# Patient Record
Sex: FEMALE | Race: WHITE | Employment: FULL TIME | URBAN - METROPOLITAN AREA
[De-identification: names, ages, dates, MRNs, and addresses within clinical notes are randomized per-mention and may not be internally consistent; named-entity substitution may affect disease eponyms.]

---

## 2017-01-03 ENCOUNTER — APPOINTMENT (OUTPATIENT)
Dept: PHYSICAL THERAPY | Facility: HOSPITAL | Age: 47
End: 2017-01-03
Payer: COMMERCIAL

## 2017-01-06 ENCOUNTER — TELEPHONE (OUTPATIENT)
Dept: FAMILY MEDICINE CLINIC | Facility: CLINIC | Age: 47
End: 2017-01-06

## 2017-01-06 DIAGNOSIS — M25.562 CHRONIC PAIN OF LEFT KNEE: Primary | ICD-10-CM

## 2017-01-06 DIAGNOSIS — G89.29 CHRONIC PAIN OF LEFT KNEE: Primary | ICD-10-CM

## 2017-01-06 NOTE — TELEPHONE ENCOUNTER
Angelica from [facility] requesting new referral, pt is to be seen on Mon 9th at 6pm.

## 2017-01-10 ENCOUNTER — TELEPHONE (OUTPATIENT)
Dept: PHYSICAL THERAPY | Facility: HOSPITAL | Age: 47
End: 2017-01-10

## 2017-01-10 NOTE — TELEPHONE ENCOUNTER
Missed PT appointment on Monday 1/9/17.   Confirmed next appointment for Thursday 1/12/17 at 6:00 pm.

## 2017-01-12 ENCOUNTER — APPOINTMENT (OUTPATIENT)
Dept: PHYSICAL THERAPY | Facility: HOSPITAL | Age: 47
End: 2017-01-12
Payer: COMMERCIAL

## 2017-01-24 ENCOUNTER — APPOINTMENT (OUTPATIENT)
Dept: PHYSICAL THERAPY | Facility: HOSPITAL | Age: 47
End: 2017-01-24
Attending: FAMILY MEDICINE
Payer: COMMERCIAL

## 2017-02-02 ENCOUNTER — APPOINTMENT (OUTPATIENT)
Dept: SURGERY | Facility: CLINIC | Age: 47
End: 2017-02-02

## 2017-05-03 ENCOUNTER — OFFICE VISIT (OUTPATIENT)
Dept: FAMILY MEDICINE CLINIC | Facility: CLINIC | Age: 47
End: 2017-05-03

## 2017-05-03 VITALS
SYSTOLIC BLOOD PRESSURE: 118 MMHG | DIASTOLIC BLOOD PRESSURE: 87 MMHG | HEIGHT: 62 IN | TEMPERATURE: 99 F | WEIGHT: 237 LBS | RESPIRATION RATE: 102 BRPM | BODY MASS INDEX: 43.61 KG/M2

## 2017-05-03 DIAGNOSIS — H65.92 LEFT NON-SUPPURATIVE OTITIS MEDIA: Primary | ICD-10-CM

## 2017-05-03 DIAGNOSIS — H61.21 IMPACTED CERUMEN OF RIGHT EAR: ICD-10-CM

## 2017-05-03 PROCEDURE — 99212 OFFICE O/P EST SF 10 MIN: CPT | Performed by: NURSE PRACTITIONER

## 2017-05-03 PROCEDURE — 99214 OFFICE O/P EST MOD 30 MIN: CPT | Performed by: NURSE PRACTITIONER

## 2017-05-03 RX ORDER — CLARITHROMYCIN 500 MG/1
500 TABLET, COATED ORAL 2 TIMES DAILY
Qty: 20 TABLET | Refills: 0 | Status: SHIPPED | OUTPATIENT
Start: 2017-05-03 | End: 2017-05-13

## 2017-05-03 NOTE — PROGRESS NOTES
Ear Pain   There is pain in the left ear. This is a new problem. The current episode started yesterday. The problem occurs constantly. The problem has been unchanged. The maximum temperature recorded prior to her arrival was 100.4 - 100.9 F.  The fever has Concern Yes    Comment: Coffee, Tea     Social History Narrative         Current Outpatient Prescriptions:  clarithromycin 500 MG Oral Tab Take 1 tablet (500 mg total) by mouth 2 (two) times daily.  Disp: 20 tablet Rfl: 0       Allergies:    Amoxicillin

## 2017-05-03 NOTE — PATIENT INSTRUCTIONS
To help ease ear infection and pain:    1. Sitting upright lessens the throbbing. 2. A heating pad on low over the ear can help by diverting blood flow away from the ear drum.   3. Pain medications are the best thing to help pain - use them as needed for t

## 2017-07-13 ENCOUNTER — OFFICE VISIT (OUTPATIENT)
Dept: OBGYN CLINIC | Facility: CLINIC | Age: 47
End: 2017-07-13

## 2017-07-13 ENCOUNTER — APPOINTMENT (OUTPATIENT)
Dept: LAB | Facility: HOSPITAL | Age: 47
End: 2017-07-13
Attending: OBSTETRICS & GYNECOLOGY
Payer: COMMERCIAL

## 2017-07-13 VITALS
DIASTOLIC BLOOD PRESSURE: 76 MMHG | BODY MASS INDEX: 44 KG/M2 | HEART RATE: 82 BPM | WEIGHT: 243 LBS | SYSTOLIC BLOOD PRESSURE: 113 MMHG

## 2017-07-13 DIAGNOSIS — N95.1 PERIMENOPAUSE: ICD-10-CM

## 2017-07-13 DIAGNOSIS — N95.1 PERIMENOPAUSE: Primary | ICD-10-CM

## 2017-07-13 LAB
ESTRADIOL SERPL-MCNC: 21 PG/ML
FSH SERPL-ACNC: 21.3 MIU/ML

## 2017-07-13 PROCEDURE — 83001 ASSAY OF GONADOTROPIN (FSH): CPT

## 2017-07-13 PROCEDURE — 82670 ASSAY OF TOTAL ESTRADIOL: CPT

## 2017-07-13 PROCEDURE — 36415 COLL VENOUS BLD VENIPUNCTURE: CPT

## 2017-07-13 PROCEDURE — 99213 OFFICE O/P EST LOW 20 MIN: CPT | Performed by: OBSTETRICS & GYNECOLOGY

## 2017-07-13 RX ORDER — HYDROCHLOROTHIAZIDE 12.5 MG/1
CAPSULE, GELATIN COATED ORAL
COMMUNITY
Start: 2017-07-12

## 2017-07-13 NOTE — PROGRESS NOTES
Well Woman Exam    HPI:  The patient is a ***  Reviewed medical and surgical history below   OBSTETRICS HISTORY:  Obstetric History     T0    L0    SAB0  TAB0  Ectopic0  Multiple0  Live Births0       GYNE HISTORY:    Sexual activity: Not Macel Col[place] depression or anxiety.        07/13/17  1415   BP: 113/76   Pulse: 82       PHYSICAL EXAM:   Constitutional: well developed, well nourished  Head/Face: normocephalic  Neck/Thyroid: thyroid symmetric, no thyromegaly, no nodules, no adenopathy  Heart: Regular

## 2017-07-13 NOTE — PROGRESS NOTES
Juany Gregorio is a 51year old female  Patient's last menstrual period was 10/15/2016 (within days). Patient presents with:  Gyn Problem: Irregular menses LMP 10/15/2016  Patient presents today for amenorrhea since Oct. 2016.  She states she had her depression or anxiety. 07/13/17  1415   BP: 113/76   Pulse: 82         Assessment & Plan:      Carolynn-menopausal   1. Reviewed amenorrhea may be due to menopause however at this time it is hard to tell.  She did not take provera in the past.   2. Labs las

## 2017-08-03 ENCOUNTER — TELEPHONE (OUTPATIENT)
Dept: OBGYN CLINIC | Facility: CLINIC | Age: 47
End: 2017-08-03

## 2017-08-03 NOTE — TELEPHONE ENCOUNTER
Left message for patient to call back to go over lab results. Will discuss Provera withdrawal bleed as an option.

## 2020-11-02 ENCOUNTER — NURSE TRIAGE (OUTPATIENT)
Dept: FAMILY MEDICINE CLINIC | Facility: CLINIC | Age: 50
End: 2020-11-02

## 2020-11-02 NOTE — TELEPHONE ENCOUNTER
Action Requested: Summary for Provider     []  Critical Lab, Recommendations Needed  [] Need Additional Advice  []   FYI    []   Need Orders  [] Need Medications Sent to Pharmacy  []  Other     SUMMARY:     Patient exposed to mother's nephew on 10/30 who t

## 2023-07-26 ENCOUNTER — TELEPHONE (OUTPATIENT)
Dept: FAMILY MEDICINE CLINIC | Facility: CLINIC | Age: 53
End: 2023-07-26

## 2023-08-01 ENCOUNTER — PATIENT OUTREACH (OUTPATIENT)
Dept: CASE MANAGEMENT | Age: 53
End: 2023-08-01

## 2023-08-01 NOTE — PROCEDURES
The office order for PCP removal request is Approved and finalized on August 1, 2023.     Thanks,  [facility] [employer]

## 2024-04-25 ENCOUNTER — OFFICE VISIT (OUTPATIENT)
Dept: FAMILY MEDICINE CLINIC | Facility: CLINIC | Age: 54
End: 2024-04-25
Payer: COMMERCIAL

## 2024-04-25 VITALS
TEMPERATURE: 97 F | WEIGHT: 238 LBS | HEIGHT: 64 IN | DIASTOLIC BLOOD PRESSURE: 92 MMHG | SYSTOLIC BLOOD PRESSURE: 148 MMHG | BODY MASS INDEX: 40.63 KG/M2 | HEART RATE: 69 BPM

## 2024-04-25 DIAGNOSIS — E66.01 MORBID OBESITY DUE TO EXCESS CALORIES (HCC): Chronic | ICD-10-CM

## 2024-04-25 DIAGNOSIS — Z12.31 VISIT FOR SCREENING MAMMOGRAM: Chronic | ICD-10-CM

## 2024-04-25 DIAGNOSIS — I10 ESSENTIAL HYPERTENSION: ICD-10-CM

## 2024-04-25 DIAGNOSIS — Z23 NEED FOR VACCINATION: ICD-10-CM

## 2024-04-25 DIAGNOSIS — R06.83 SNORING: Chronic | ICD-10-CM

## 2024-04-25 DIAGNOSIS — G47.8 UNREFRESHED BY SLEEP: Chronic | ICD-10-CM

## 2024-04-25 DIAGNOSIS — Z00.00 ADULT GENERAL MEDICAL EXAM: Primary | ICD-10-CM

## 2024-04-25 DIAGNOSIS — Z12.4 CERVICAL CANCER SCREENING: ICD-10-CM

## 2024-04-25 DIAGNOSIS — Z12.11 SCREENING FOR COLON CANCER: ICD-10-CM

## 2024-04-25 DIAGNOSIS — R40.0 HAS DAYTIME DROWSINESS: Chronic | ICD-10-CM

## 2024-04-25 PROCEDURE — 90715 TDAP VACCINE 7 YRS/> IM: CPT | Performed by: FAMILY MEDICINE

## 2024-04-25 PROCEDURE — 90750 HZV VACC RECOMBINANT IM: CPT | Performed by: FAMILY MEDICINE

## 2024-04-25 PROCEDURE — 99386 PREV VISIT NEW AGE 40-64: CPT | Performed by: FAMILY MEDICINE

## 2024-04-25 PROCEDURE — 90472 IMMUNIZATION ADMIN EACH ADD: CPT | Performed by: FAMILY MEDICINE

## 2024-04-25 PROCEDURE — 96127 BRIEF EMOTIONAL/BEHAV ASSMT: CPT | Performed by: FAMILY MEDICINE

## 2024-04-25 PROCEDURE — 90471 IMMUNIZATION ADMIN: CPT | Performed by: FAMILY MEDICINE

## 2024-04-25 NOTE — PROGRESS NOTES
Patient ID: Lianet Banuelos is a 53 year old female.    HPI  Chief Complaint   Patient presents with    Routine Physical     Last physical on 12/23/2015.    Patient is overdue for labs and is aware she's due. Pt hasn't completed a mammogram or a pap smear recently. She has never had a colonoscopy as well; I provided a referral to GI. She will receive the Tetanus and Shingrix vaccines today. Pt doesn't monitor her BP regularly at home; however, when she did recently, her systolic was 132 at home as she checks her mother's blood pressure. She states her BP has never been as high as the results we got at the office today; I discussed this with her.  She is not having any vision changes, headaches, chest pain or shortness of breath.    She snores and feels unrefreshed by sleep. Pt has daytime drowsiness. She has never completed a sleep study in the past; I discussed this with her.  Her mother states that she does snore.      Health Maintenance   Topic Date Due    Annual Physical  Never done    Colorectal Cancer Screening  Never done    DTaP,Tdap,and Td Vaccines (1 - Tdap) Never done    Mammogram  01/19/2017    Pap Smear  01/21/2019    Zoster Vaccines (1 of 2) Never done    COVID-19 Vaccine (1 - 2023-24 season) Never done    Annual Depression Screening  01/01/2024    Influenza Vaccine (Season Ended) 10/01/2024    Pneumococcal Vaccine: Birth to 64yrs  Aged Out       =======================================================    Lab Results   Component Value Date    WBC 6.9 12/23/2015    RBC 5.04 12/23/2015    HGB 13.5 12/23/2015    HCT 43.1 12/23/2015     12/23/2015    MPV 10.3 12/23/2015    MCV 85.6 12/23/2015    MCH 26.9 (L) 12/23/2015    MCHC 31.4 (L) 12/23/2015    RDW 13.9 12/23/2015    NEUT 4.4 12/23/2015    LYMPH 1.8 12/23/2015    MON 0.5 12/23/2015    EOS 0.2 12/23/2015    BASO 0.1 12/23/2015    NEPERCENT 63 12/23/2015    LYPERCENT 26 12/23/2015    MOPERCENT 7 12/23/2015    EOPERCENT 2 12/23/2015    BAPERCENT 1  12/23/2015       Lab Results   Component Value Date     (H) 12/23/2015    BUN 11 12/23/2015    BUNCREA 20.0 12/23/2015    CREATSERUM 0.55 12/23/2015    ANIONGAP 6 12/23/2015    GFRNAA >60 12/23/2015    GFRAA >60 12/23/2015    CA 8.9 12/23/2015    OSMOCALC 286 12/23/2015    AST 24 12/23/2015    ALT 30 12/23/2015    ALKPHOS 87 12/23/2015    BILT 0.4 12/23/2015    TP 7.3 12/23/2015    ALB 3.9 12/23/2015    GLOBULIN 3.4 12/23/2015    AGRATIO 1.1 12/23/2015     12/23/2015    K 4.1 12/23/2015     12/23/2015    CO2 25 12/23/2015       Lab Results   Component Value Date     (H) 12/23/2015    BUN 11 12/23/2015    CREATSERUM 0.55 12/23/2015    BUNCREA 20.0 12/23/2015    ANIONGAP 6 12/23/2015    GFRAA >60 12/23/2015    GFRNAA >60 12/23/2015    CA 8.9 12/23/2015     12/23/2015    K 4.1 12/23/2015     12/23/2015    CO2 25 12/23/2015    OSMOCALC 286 12/23/2015       Lab Results   Component Value Date    CHOLEST 201 (H) 12/23/2015    TRIG 114 12/23/2015    HDL 58 12/23/2015     (H) 12/23/2015    CALCNONHDL 143 (H) 12/23/2015     TSH (S) (uIU/mL)   Date Value   12/23/2015 1.04       =======================================================    Wt Readings from Last 6 Encounters:   04/25/24 238 lb   07/13/17 243 lb   05/03/17 237 lb   12/29/16 237 lb 8 oz   11/21/16 232 lb   11/12/16 232 lb               BMI Readings from Last 6 Encounters:   04/25/24 40.85 kg/m²   07/13/17 44.45 kg/m²   05/03/17 43.35 kg/m²   12/29/16 43.44 kg/m²   11/21/16 41.10 kg/m²   11/12/16 41.11 kg/m²       BP Readings from Last 6 Encounters:   04/25/24 (!) 148/92   07/13/17 113/76   05/03/17 118/87   12/29/16 130/80   11/21/16 134/84   11/12/16 154/70         Review of Systems   Respiratory:  Negative for shortness of breath.    Cardiovascular:  Negative for chest pain.         Past Medical History:    Hypercholesterolemia    Morbid obesity with BMI of 40.0-44.9, adult (HCC)       Past Surgical History:   Procedure  Laterality Date    Other surgical history      trabeculectomy    Tonsillectomy      age 4        Social History     Socioeconomic History    Marital status: Single     Spouse name: Not on file    Number of children: Not on file    Years of education: Not on file    Highest education level: Not on file   Occupational History    Not on file   Tobacco Use    Smoking status: Never    Smokeless tobacco: Never   Substance and Sexual Activity    Alcohol use: No     Alcohol/week: 0.0 standard drinks of alcohol    Drug use: No    Sexual activity: Not Currently     Partners: Male   Other Topics Concern     Service Not Asked    Blood Transfusions Not Asked    Caffeine Concern Yes     Comment: Coffee, Tea    Occupational Exposure Not Asked    Hobby Hazards Not Asked    Sleep Concern Not Asked    Stress Concern Not Asked    Weight Concern Not Asked    Special Diet Not Asked    Back Care Not Asked    Exercise Not Asked    Bike Helmet Not Asked    Seat Belt Not Asked    Self-Exams Not Asked   Social History Narrative    Not on file     Social Determinants of Health     Financial Resource Strain: Not on file   Food Insecurity: Not on file   Transportation Needs: Not on file   Physical Activity: Not on file   Stress: Not on file   Social Connections: Not on file   Housing Stability: Not on file          No current outpatient medications on file.     Allergies:  Allergies   Allergen Reactions    Amoxicillin HIVES      PHYSICAL EXAM:   Physical Exam      Physical Exam   Constitutional: . She appears well-developed and well-nourished. No distress.   Head: Normocephalic.   Right Ear: Tympanic membrane and ear canal normal.   Left Ear: Tympanic membrane and ear canal normal.   Nose: No mucosal edema or rhinorrhea.  Mouth/Throat: Oropharynx is clear and moist and mucous membranes are normal. Tonsils are surgically absent.   Eyes: Conjunctivae and EOM are normal. Pupils are equal, round, and reactive to light.   Neck: Normal range  of motion. Neck supple. No thyromegaly present.   Cardiovascular: Normal rate, regular rhythm and no murmur heard.   Pulmonary/Chest: Effort normal and breath sounds normal. No respiratory distress.   Abdominal: Soft. Bowel sounds are normal. There is no hepatosplenomegaly. There is no tenderness.   Lymphadenopathy: She has no cervical adenopathy.   Neurological: She is alert and oriented to person, place, and time. She has normal reflexes. No cranial nerve deficit.   Skin: Skin is warm and dry. No rash noted.   Psychiatric: She has a normal mood and affect.  Lower legs: No edema of the legs bilaterally.    Vitals reviewed.    Blood pressure 153/85, pulse 69, temperature 97.2 °F (36.2 °C), temperature source Temporal, height 5' 4\" (1.626 m), weight 238 lb, not currently breastfeeding.    Vitals:    04/25/24 1307 04/25/24 1321   BP: 153/85 (!) 148/92   Pulse: 69    Temp: 97.2 °F (36.2 °C)    TempSrc: Temporal    Weight: 238 lb    Height: 5' 4\" (1.626 m)             ASSESSMENT/PLAN:     Diagnoses and all orders for this visit:    Adult general medical exam  -     CBC With Differential With Platelet  -     Comp Metabolic Panel (14)  -     Lipid Panel  -     Assay, Thyroid Stim Hormone  She will do labs at [facility].  Screening for colon cancer  -     GASTRO - INTERNAL    Cervical cancer screening  -     OBG - INTERNAL    Visit for screening mammogram  -     [facility] DESI 2D+3D SCREENING BILAT (CPT=77067/03634); Future    Need for vaccination  -     Immunization Admin Counseling, 1st Component, 18 years and older  -     TdaP (Boostrix/Adacel) Vaccine (> 7 Y)  -     Immunization Admin Counseling, Additional Component, 18 years and older  -     Shingrix (Shingles recombinant) Immunization (50 and older)  I told her the second Shingrix will be 2 months or later.  Snoring  -     OP [facility] ALT REFERRAL HOME SLEEP APNEA TEST  Explained what sleep apnea is and explained how dangerous that it is.  She will get a home sleep apnea  test.  Unrefreshed by sleep  -     OP EM ALT REFERRAL HOME SLEEP APNEA TEST    Has daytime drowsiness  -     OP EM ALT REFERRAL HOME SLEEP APNEA TEST    Morbid obesity due to excess calories (HCC)  -     OP EM ALT REFERRAL HOME SLEEP APNEA TEST  -     Hemoglobin A1C    Essential hypertension  -     EKG 12 Lead; Future  -     XR CHEST PA + LAT CHEST (CPT=71046); Future  -     URINALYSIS, ROUTINE [[id number]][Q]  Blood pressure was elevated.  I would like to do an EKG, chest x-ray as she does also have morbid obesity.      Referrals (if applicable)  Orders Placed This Encounter   Procedures    OBG - INTERNAL     OB/GYN.     Referral Priority:   Routine     Referral Type:   OFFICE VISIT     Referred to Provider:   Gagandeep Dupree PA-C     Requested Specialty:   OBSTETRICS & GYNECOLOGY     Number of Visits Requested:   3    GASTRO - INTERNAL     Gastroenterology Department phone number is [phone].  ++++ Ask for first available provider that can see you ++++.     Referral Priority:   Routine     Referral Type:   OFFICE VISIT     Requested Specialty:   GASTROENTEROLOGY     Number of Visits Requested:   3    OP EM ALT REFERRAL HOME SLEEP APNEA TEST     Referral Priority:   Routine     Referral Type:   Sleep Study       Follow up if symptoms persist.  Take medicine (if given) as prescribed.  Approach to treatment discussed and patient/family member understands and agrees to plan.     No follow-ups on file.    Patient Instructions   Fast for 10 hours.  You can have water but no food and then get your labs done at [facility].                                           BLOOD PRESSURE CUFF    Get a blood pressure cuff that goes on the arm.  Do not get the wrist cuff.  Check your blood pressures on your left arm daily at home.  Do this with your left arm supported by a table, do not have it hanging down at your side.  Make sure you do this before you have any coffee or caffeine.  Go ahead and write these numbers down a  piece of paper for me.  After you get about 10 to 14 days worth of numbers send them to me through [employer].  [employer] is a very reputable brand.       Jamil Lopez    4/25/2024    By signing my name below, I, Jamil Lopez,  attest that this documentation has been prepared under the direction and in the presence of Stephen Estrada DO.   Electronically Signed: Jamil Lopez, 4/25/2024, 1:07 PM.    I, Stephen Estrada DO,  personally performed the services described in this documentation. All medical record entries made by the scribe were at my direction and in my presence.  I have reviewed the chart and discharge instructions (if applicable) and agree that the record reflects my personal performance and is accurate and complete.  Stephen Estrada DO, 4/25/2024, 1:36 PM

## 2024-04-25 NOTE — PATIENT INSTRUCTIONS
Fast for 10 hours.  You can have water but no food and then get your labs done at [facility].                                           BLOOD PRESSURE CUFF    Get a blood pressure cuff that goes on the arm.  Do not get the wrist cuff.  Check your blood pressures on your left arm daily at home.  Do this with your left arm supported by a table, do not have it hanging down at your side.  Make sure you do this before you have any coffee or caffeine.  Go ahead and write these numbers down a piece of paper for me.  After you get about 10 to 14 days worth of numbers send them to me through [employer].  [employer] is a very reputable brand.

## 2024-06-18 ENCOUNTER — OFFICE VISIT (OUTPATIENT)
Dept: OBGYN CLINIC | Facility: CLINIC | Age: 54
End: 2024-06-18

## 2024-06-18 ENCOUNTER — OFFICE VISIT (OUTPATIENT)
Dept: SLEEP CENTER | Age: 54
End: 2024-06-18
Attending: FAMILY MEDICINE

## 2024-06-18 VITALS
SYSTOLIC BLOOD PRESSURE: 115 MMHG | DIASTOLIC BLOOD PRESSURE: 75 MMHG | WEIGHT: 235 LBS | BODY MASS INDEX: 40.12 KG/M2 | HEIGHT: 64 IN | HEART RATE: 70 BPM

## 2024-06-18 DIAGNOSIS — E66.01 MORBID OBESITY DUE TO EXCESS CALORIES (HCC): Primary | ICD-10-CM

## 2024-06-18 DIAGNOSIS — Z01.419 WELL WOMAN EXAM WITH ROUTINE GYNECOLOGICAL EXAM: Primary | ICD-10-CM

## 2024-06-18 PROCEDURE — 99386 PREV VISIT NEW AGE 40-64: CPT | Performed by: STUDENT IN AN ORGANIZED HEALTH CARE EDUCATION/TRAINING PROGRAM

## 2024-06-18 PROCEDURE — 95806 SLEEP STUDY UNATT&RESP EFFT: CPT

## 2024-06-18 NOTE — PROGRESS NOTES
[facility]  Obstetrics and Gynecology  Annual  Gagandeep Dupree PA-C    Chief Complaint   Patient presents with    Annual     Pt here for annual       Lianet S Lakisha is a 53 year old female  presenting for her annual well woman exam. No complaints or concerns today. Patient's last menstrual period was 10/15/2016 (within days).  Postmenopause. 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 Not sexually active. She denies any abnormal vaginal discharge, odor, irritation, or itching. No breast pain or masses. No dysuria or hematuria.     Pap:2016   Contraception:Postmenopause  Mammo: 2016  Colonoscopy: 2016      OBSTETRICS HISTORY:     OB History    Para Term  AB Living   0 0 0 0 0 0   SAB IAB Ectopic Multiple Live Births   0 0 0 0         GYNE HISTORY:     Menarche: 8 (2024  2:47 PM)  Use of Birth Control (if yes, specify type): Postmenopausal (2024  2:47 PM)  Hx Prior Abnormal Pap: No (2024  2:47 PM)  Pap Date: 16 (2024  2:47 PM)  Pap Result Notes: hpv positive (2024  2:47 PM)      History   Sexual Activity    Sexual activity: Not Currently    Partners: Male            No data to display                  MEDICAL HISTORY:     Past Medical History:   Diagnosis Date    Hypercholesterolemia     Morbid obesity with BMI of 40.0-44.9, adult (HCC)       Past Surgical History:   Procedure Laterality Date    Tonsillectomy      age 4        SOCIAL HISTORY:     Tobacco Use: Low Risk  (2024)    Patient History     Smoking Tobacco Use: Never     Smokeless Tobacco Use: Never     Passive Exposure: Never       Depression Screening:   Depression Screening (PHQ-2/PHQ-9): Over the LAST 2 WEEKS   Little interest or pleasure in doing things (over the last two weeks)?: Not at all    Feeling down, depressed, or hopeless (over the last two weeks)?: Not at all    PHQ-2 SCORE: 0          FAMILY HISTORY:     Family History   Problem Relation Age of Onset    Lipids  Mother         Hyperlipidemia    Hypertension Mother        MEDICATIONS:     No current outpatient medications on file.    ALLERGIES:       Allergies   Allergen Reactions    Amoxicillin HIVES       REVIEW OF SYSTEMS:     Review of Systems   Constitutional:  Negative for chills, fever and unexpected weight change.   Respiratory: Negative.     Cardiovascular: Negative.    Gastrointestinal:  Negative for abdominal pain, constipation, diarrhea and nausea.   Genitourinary:  Negative for dyspareunia, dysuria, genital sores, hematuria, menstrual problem, pelvic pain, vaginal bleeding, vaginal discharge and vaginal pain.   Musculoskeletal: Negative.    Skin: Negative.    Neurological: Negative.    Hematological: Negative.    Psychiatric/Behavioral: Negative.           PHYSICAL EXAM:     Vitals:    06/18/24 1442   BP: 115/75   Pulse: 70   Weight: 235 lb (106.6 kg)   Height: 5' 4\" (1.626 m)       Body mass index is 40.34 kg/m².     Constitutional: well developed, well nourished  Psychiatric:  Oriented to time, place, person and situation. Appropriate mood and affect  Head/Face: normocephalic  Neck/Thyroid: thyroid symmetric, no thyromegaly, no nodules, no adenopathy  Lymphatic:no abnormal supraclavicular or axillary adenopathy is noted  Breast: normal without palpable masses, tenderness, asymmetry, nipple discharge, nipple retraction or skin changes  Abdomen:  soft, nontender, nondistended, no masses  Skin/Hair: no unusual rashes or bruises  Extremities: no edema, no cyanosis    Pelvic Exam:  External Genitalia: normal appearance, hair distribution, and no lesions  Urethral Meatus:  normal in size, location, without lesions and prolapse  Bladder:  No fullness, masses or tenderness  Vagina:  Normal appearance without lesions, no abnormal discharge  Cervix:  Normal without tenderness on motion  Uterus: normal in size, contour, position, mobility, without tenderness  Adnexa: normal without masses or tenderness  Perineum:  normal  Anus: no hemorroids       ASSESSMENT:     Lianet was seen today for annual.    Diagnoses and all orders for this visit:    Well woman exam with routine gynecological exam  -     ThinPrep PAP Smear; Future  -     Hpv Dna  High Risk , Thin Prep Collect; Future            PLAN:   Normal exam.  Pap smear done.   Recommend repeat pap smear with co-testing every 3 years for normal/ -HPV.  Recommend annual screening mammograms.   Recommend screening colonoscopy starting at 50  Recommend DEXA scan for osteoporosis as indicated.  Discussed importance of incorporating frequent weight bearing exercises and supplemental Vitamin D  Maintain healthy lifestyle with well-balance diet and daily exercise.  Return to clinic in one year or as needed.        SUMMARY:  Pap: Next cotest 3-5 years per ASCCP guidelines.  BCM:  Postmenopausal  STD screening: No  Mammogram: has order from PCP  HM updated    FOLLOW-UP     No follow-ups on file.    ARNOL WATSON PA-C  2:49 PM  6/18/2024    Note to patient and family:  The 21st Century Cures Act makes medical notes available to patients in the interest of transparency.  However, please be advised that this is a medical document.  It is intended as a peer to peer communication.  It is written in medical language and may contain abbreviations or verbiage that are technical and unfamiliar.  It may appear blunt or direct.  Medical documents are intended to carry relevant information, facts as evident, and the clinical opinion of the practitioner.

## 2024-06-19 LAB — HPV E6+E7 MRNA CVX QL NAA+PROBE: NEGATIVE

## 2024-06-20 ENCOUNTER — ORDER TRANSCRIPTION (OUTPATIENT)
Dept: SLEEP CENTER | Age: 54
End: 2024-06-20

## 2024-06-20 DIAGNOSIS — G47.8 SLEEP AROUSAL DISORDER: ICD-10-CM

## 2024-06-20 DIAGNOSIS — E66.01 MORBID OBESITY (HCC): Primary | ICD-10-CM

## 2024-06-20 DIAGNOSIS — R06.83 SNORING: ICD-10-CM

## 2024-06-20 DIAGNOSIS — R40.0 HAS DAYTIME DROWSINESS: ICD-10-CM

## 2024-06-21 NOTE — PROCEDURES
[facility]  Accredited by the American Academy of Sleep Medicine (AASM)    PATIENT'S NAME: LUPE GARCÍA   ATTENDING PHYSICIAN: Stephen Estrada DO   REFERRING PHYSICIAN: Stephen Estrada DO   PATIENT ACCOUNT #: [id number] LOCATION: Sleep Center   MEDICAL RECORD #: [id number] YOB: 1970   DATE OF STUDY: 06/18/2024       SLEEP STUDY REPORT    STUDY TYPE:  Home sleep test.    INDICATION:  Suspected obstructive sleep apnea (ICD-10 code G47.33) in patient with witnessed apneic events, snoring, unrefreshing sleep, nocturnal awakenings, 20-pound weight gain, body mass index 39.9, and an Drayton Sleepiness Scale score of 4.    RESULTS:  The patient underwent home sleep test with measurement of her nasal air flow, nasal air pressure, snoring, chest and abdominal wall motion, oximetry, and body position.  I have reviewed the entirety of the raw data of this study.  During this study, the total recording time is 557 minutes.  The lights-out clock time is 11:12 p.m., lights-on clock time is 8:29 a.m.  The apnea plus hypopnea index is 10 events per hour.  The supine apnea plus hypopnea index is 12.4 events per hour.  The average oxygen saturation is 97%, the lowest oxygen saturation is 88%, and the patient spent 0.8% of the test with saturations 88% or less.  The average heart rate was 68 beats per minute, and the patient spent approximately 50% of the test in the supine position.    INTERPRETATION:  The data generated from this study is consistent with mild obstructive sleep apnea (ICD-10 code G47.33).    RECOMMENDATIONS:    1.   Would consider CPAP titration in this symptomatic patient.    2.   Weight loss.    3.   Avoid alcohol.    4.   Avoid sedating drug.  5.   Patient should not drive if at all sleepy.     Please do not hesitate to contact me if there is any question whatsoever regarding interpretation of this study.    Dictated By Marcelo Rice MD  d: 06/20/2024 18:34:43  t: 06/20/2024  20:12:22  [facility] [id number]  [facility]/    cc: Stephen Estrada DO

## 2024-06-23 DIAGNOSIS — G47.33 OSA (OBSTRUCTIVE SLEEP APNEA): Primary | Chronic | ICD-10-CM

## 2024-07-09 ENCOUNTER — HOSPITAL ENCOUNTER (OUTPATIENT)
Dept: MAMMOGRAPHY | Age: 54
Discharge: HOME | End: 2024-07-09
Attending: FAMILY MEDICINE
Payer: COMMERCIAL

## 2024-07-09 DIAGNOSIS — Z12.31 VISIT FOR SCREENING MAMMOGRAM: Chronic | ICD-10-CM

## 2024-07-09 PROCEDURE — 77067 SCR MAMMO BI INCL CAD: CPT | Performed by: FAMILY MEDICINE

## 2024-07-09 PROCEDURE — 77063 BREAST TOMOSYNTHESIS BI: CPT | Performed by: FAMILY MEDICINE

## 2024-07-10 LAB
ABSOLUTE BASOPHILS: 8 CELLS/UL (ref 0–200)
ABSOLUTE EOSINOPHILS: 199 CELLS/UL (ref 15–500)
ABSOLUTE LYMPHOCYTES: 2125 CELLS/UL (ref 850–3900)
ABSOLUTE MONOCYTES: 432 CELLS/UL (ref 200–950)
ABSOLUTE NEUTROPHILS: 5536 CELLS/UL (ref 1500–7800)
ALBUMIN/GLOBULIN RATIO: 1.5 (CALC) (ref 1–2.5)
ALBUMIN: 4.1 G/DL (ref 3.6–5.1)
ALKALINE PHOSPHATASE: 99 U/L (ref 37–153)
ALT: 43 U/L (ref 6–29)
AST: 44 U/L (ref 10–35)
BASOPHILS: 0.1 %
BILIRUBIN, TOTAL: 0.7 MG/DL (ref 0.2–1.2)
BILIRUBIN: NEGATIVE
BUN: 16 MG/DL (ref 7–25)
CALCIUM: 9 MG/DL (ref 8.6–10.4)
CARBON DIOXIDE: 25 MMOL/L (ref 20–32)
CHLORIDE: 104 MMOL/L (ref 98–110)
CHOL/HDLC RATIO: 3 (CALC)
CHOLESTEROL, TOTAL: 185 MG/DL
COLOR: YELLOW
CREATININE: 0.63 MG/DL (ref 0.5–1.03)
EGFR: 105 ML/MIN/1.73M2
EOSINOPHILS: 2.4 %
GLOBULIN: 2.8 G/DL (CALC) (ref 1.9–3.7)
GLUCOSE: 122 MG/DL (ref 65–99)
GLUCOSE: NEGATIVE
HDL CHOLESTEROL: 62 MG/DL
HEMATOCRIT: 42.9 % (ref 35–45)
HEMOGLOBIN A1C: 6.2 % OF TOTAL HGB
HEMOGLOBIN: 13.9 G/DL (ref 11.7–15.5)
KETONES: NEGATIVE
LDL-CHOLESTEROL: 97 MG/DL (CALC)
LYMPHOCYTES: 25.6 %
MCH: 28 PG (ref 27–33)
MCHC: 32.4 G/DL (ref 32–36)
MCV: 86.3 FL (ref 80–100)
MONOCYTES: 5.2 %
MPV: 11.6 FL (ref 7.5–12.5)
NEUTROPHILS: 66.7 %
NITRITE: NEGATIVE
NON-HDL CHOLESTEROL: 123 MG/DL (CALC)
OCCULT BLOOD: NEGATIVE
PH: 5.5 (ref 5–8)
PLATELET COUNT: 269 THOUSAND/UL (ref 140–400)
POTASSIUM: 4 MMOL/L (ref 3.5–5.3)
PROTEIN, TOTAL: 6.9 G/DL (ref 6.1–8.1)
PROTEIN: NEGATIVE
RDW: 12.8 % (ref 11–15)
RED BLOOD CELL COUNT: 4.97 MILLION/UL (ref 3.8–5.1)
SODIUM: 138 MMOL/L (ref 135–146)
SPECIFIC GRAVITY: 1.02 (ref 1–1.03)
TRIGLYCERIDES: 165 MG/DL
TSH: 2 MIU/L
WHITE BLOOD CELL COUNT: 8.3 THOUSAND/UL (ref 3.8–10.8)

## (undated) NOTE — Clinical Note
5/3/2017          To Whom It May Concern:    Santos Nieves is currently under my medical care and may not return to work at this time. Please excuse Yvonne Tess for 1 days. She may return to work on May 5, 2017. Activity is restricted as follows: none.     I

## (undated) NOTE — MR AVS SNAPSHOT
[address], [address]  [address] [facility]  [phone]               Thank you for choosing us for your health care visit with ITZ Zuniga.   We are glad to serve you and happy to provide you with this perez -use earwax removal drops (Debrox), 5 drops into affected ear, at night for 5 nights  -return to office in one week for irrigation       Allergies as of May 03, 2017     Amoxicillin Hives                Today's Vital Signs     BP Temp Height Weight BMI MyChart     Visit [email or web address]  You can access your MyChart to more actively manage your health care and view more details from this visit by going to [email or web address].   If you've recently had a stay at the Hospital you can access your discharge ins

## (undated) NOTE — MR AVS SNAPSHOT
After Visit Summary   6/18/2024    Lianet Banuelos   MRN: [id number]           Visit Information     Date & Time  6/18/2024  3:00 PM Provider  Gagandeep Dupree PA-C Department  [facility] - OB/GYN Dept. Phone  [phone]      Your Vitals Were  Most recent update: 6/18/2024  2:46 PM    BP   115/75          Pulse   70          Ht   64\"          Wt   235 lb          LMP   10/15/2016 (Within Days)             Breastfeeding   No    BMI   40.34 kg/m²         Allergies as of 6/18/2024  Review status set to Review Complete on 6/18/2024       Noted Reaction Type Reactions    Amoxicillin 12/23/2015    HIVES      Diagnoses for This Visit    Well woman exam with routine gynecological exam   [223734]  -  Primary           We Ordered the Following     Normal Orders This Visit    Hpv Dna  High Risk , Thin Prep Collect [QAC9556 CUSTOM]     THINPREP PAP SMEAR ONLY [[id number] CUSTOM]     ThinPrep PAP Smear [XEA1200 CUSTOM]     Future Labs/Procedures Expected by Expires    Hpv Dna  High Risk , Thin Prep Collect [[id number] CUSTOM]  6/18/2024 6/18/2025    ThinPrep PAP Smear [[id number] CUSTOM]  6/18/2024 6/18/2025      Future Appointments        Provider Department    7/9/2024 1:00 PM PATRICIA BAILEY RM1; PATRICIA PÉREZ RM1 [facility] - [place]                Did you know that [facility] [place] primary care physicians now offer Video Visits through [employer] for adult patients for a variety of conditions such as allergies, back pain and cold symptoms? Skip the drive and waiting room and online chat with a doctor face-to-face using your web-cam enabled computer or mobile device wherever you are. Video Visits cost $50 and can be paid hassle-free using a credit, debit, or health savings card.  Not active on [employer]? Ask us how to get signed up today!          If you receive a survey from [employer], please take a few minutes to complete it and provide feedback. We strive to deliver the best  patient experience and are looking for ways to make improvements. Your feedback will help us do so. For more information on [employer], please visit [email or web address]           No text in SmartText           No text in SmartText